# Patient Record
Sex: MALE | Race: WHITE | NOT HISPANIC OR LATINO | Employment: FULL TIME | URBAN - METROPOLITAN AREA
[De-identification: names, ages, dates, MRNs, and addresses within clinical notes are randomized per-mention and may not be internally consistent; named-entity substitution may affect disease eponyms.]

---

## 2022-02-28 ENCOUNTER — HOSPITAL ENCOUNTER (EMERGENCY)
Facility: HOSPITAL | Age: 42
Discharge: HOME | End: 2022-02-28
Attending: EMERGENCY MEDICINE | Admitting: EMERGENCY MEDICINE

## 2022-02-28 ENCOUNTER — APPOINTMENT (OUTPATIENT)
Dept: CT IMAGING | Facility: HOSPITAL | Age: 42
End: 2022-02-28

## 2022-02-28 VITALS
RESPIRATION RATE: 16 BRPM | SYSTOLIC BLOOD PRESSURE: 118 MMHG | OXYGEN SATURATION: 97 % | DIASTOLIC BLOOD PRESSURE: 87 MMHG | HEART RATE: 80 BPM | TEMPERATURE: 98.1 F | HEIGHT: 72 IN

## 2022-02-28 DIAGNOSIS — G43.009 MIGRAINE WITHOUT AURA AND WITHOUT STATUS MIGRAINOSUS, NOT INTRACTABLE: Primary | ICD-10-CM

## 2022-02-28 PROCEDURE — 25010000002 PROCHLORPERAZINE 10 MG/2ML SOLUTION: Performed by: EMERGENCY MEDICINE

## 2022-02-28 PROCEDURE — 70450 CT HEAD/BRAIN W/O DYE: CPT

## 2022-02-28 PROCEDURE — 25010000002 KETOROLAC TROMETHAMINE PER 15 MG: Performed by: EMERGENCY MEDICINE

## 2022-02-28 PROCEDURE — 96375 TX/PRO/DX INJ NEW DRUG ADDON: CPT

## 2022-02-28 PROCEDURE — 25010000002 DIPHENHYDRAMINE PER 50 MG: Performed by: EMERGENCY MEDICINE

## 2022-02-28 PROCEDURE — 99282 EMERGENCY DEPT VISIT SF MDM: CPT

## 2022-02-28 PROCEDURE — 96374 THER/PROPH/DIAG INJ IV PUSH: CPT

## 2022-02-28 RX ORDER — PROCHLORPERAZINE EDISYLATE 5 MG/ML
10 INJECTION INTRAMUSCULAR; INTRAVENOUS EVERY 6 HOURS PRN
Status: DISCONTINUED | OUTPATIENT
Start: 2022-02-28 | End: 2022-02-28 | Stop reason: HOSPADM

## 2022-02-28 RX ORDER — KETOROLAC TROMETHAMINE 30 MG/ML
30 INJECTION, SOLUTION INTRAMUSCULAR; INTRAVENOUS ONCE
Status: COMPLETED | OUTPATIENT
Start: 2022-02-28 | End: 2022-02-28

## 2022-02-28 RX ORDER — DIPHENHYDRAMINE HYDROCHLORIDE 50 MG/ML
25 INJECTION INTRAMUSCULAR; INTRAVENOUS ONCE
Status: COMPLETED | OUTPATIENT
Start: 2022-02-28 | End: 2022-02-28

## 2022-02-28 RX ORDER — BUTALBITAL, ACETAMINOPHEN AND CAFFEINE 50; 325; 40 MG/1; MG/1; MG/1
1 TABLET ORAL EVERY 8 HOURS PRN
Qty: 12 TABLET | Refills: 0 | Status: SHIPPED | OUTPATIENT
Start: 2022-02-28

## 2022-02-28 RX ORDER — SODIUM CHLORIDE 0.9 % (FLUSH) 0.9 %
10 SYRINGE (ML) INJECTION AS NEEDED
Status: DISCONTINUED | OUTPATIENT
Start: 2022-02-28 | End: 2022-02-28 | Stop reason: HOSPADM

## 2022-02-28 RX ADMIN — PROCHLORPERAZINE EDISYLATE 10 MG: 5 INJECTION INTRAMUSCULAR; INTRAVENOUS at 05:49

## 2022-02-28 RX ADMIN — DIPHENHYDRAMINE HYDROCHLORIDE 25 MG: 50 INJECTION, SOLUTION INTRAMUSCULAR; INTRAVENOUS at 05:49

## 2022-02-28 RX ADMIN — KETOROLAC TROMETHAMINE 30 MG: 30 INJECTION, SOLUTION INTRAMUSCULAR; INTRAVENOUS at 05:49

## 2022-05-17 ENCOUNTER — HOSPITAL ENCOUNTER (EMERGENCY)
Facility: HOSPITAL | Age: 42
Discharge: LEFT BEFORE DISCHARGE | End: 2022-05-17

## 2022-05-17 VITALS
OXYGEN SATURATION: 96 % | RESPIRATION RATE: 16 BRPM | BODY MASS INDEX: 33.86 KG/M2 | DIASTOLIC BLOOD PRESSURE: 94 MMHG | SYSTOLIC BLOOD PRESSURE: 119 MMHG | WEIGHT: 250 LBS | HEIGHT: 72 IN | HEART RATE: 95 BPM | TEMPERATURE: 97.5 F

## 2022-05-17 PROCEDURE — 99211 OFF/OP EST MAY X REQ PHY/QHP: CPT

## 2022-05-17 PROCEDURE — 99283 EMERGENCY DEPT VISIT LOW MDM: CPT

## 2022-05-18 ENCOUNTER — HOSPITAL ENCOUNTER (EMERGENCY)
Facility: HOSPITAL | Age: 42
Discharge: HOME | End: 2022-05-18
Attending: EMERGENCY MEDICINE | Admitting: EMERGENCY MEDICINE

## 2022-05-18 VITALS
HEIGHT: 70 IN | OXYGEN SATURATION: 96 % | RESPIRATION RATE: 20 BRPM | HEART RATE: 65 BPM | TEMPERATURE: 98.3 F | BODY MASS INDEX: 36.33 KG/M2 | SYSTOLIC BLOOD PRESSURE: 120 MMHG | DIASTOLIC BLOOD PRESSURE: 70 MMHG | WEIGHT: 253.75 LBS

## 2022-05-18 DIAGNOSIS — G43.709 CHRONIC MIGRAINE WITHOUT AURA WITHOUT STATUS MIGRAINOSUS, NOT INTRACTABLE: Primary | ICD-10-CM

## 2022-05-18 PROCEDURE — 96375 TX/PRO/DX INJ NEW DRUG ADDON: CPT

## 2022-05-18 PROCEDURE — 25010000002 DROPERIDOL PER 5 MG: Performed by: NURSE PRACTITIONER

## 2022-05-18 PROCEDURE — 96361 HYDRATE IV INFUSION ADD-ON: CPT

## 2022-05-18 PROCEDURE — 25010000002 KETOROLAC TROMETHAMINE PER 15 MG: Performed by: NURSE PRACTITIONER

## 2022-05-18 PROCEDURE — 96374 THER/PROPH/DIAG INJ IV PUSH: CPT

## 2022-05-18 RX ORDER — DROPERIDOL 2.5 MG/ML
2.5 INJECTION, SOLUTION INTRAMUSCULAR; INTRAVENOUS ONCE
Status: COMPLETED | OUTPATIENT
Start: 2022-05-18 | End: 2022-05-18

## 2022-05-18 RX ORDER — ONDANSETRON 2 MG/ML
4 INJECTION INTRAMUSCULAR; INTRAVENOUS ONCE
Status: DISCONTINUED | OUTPATIENT
Start: 2022-05-18 | End: 2022-05-18

## 2022-05-18 RX ORDER — KETOROLAC TROMETHAMINE 30 MG/ML
30 INJECTION, SOLUTION INTRAMUSCULAR; INTRAVENOUS ONCE
Status: COMPLETED | OUTPATIENT
Start: 2022-05-18 | End: 2022-05-18

## 2022-05-18 RX ORDER — RIZATRIPTAN BENZOATE 5 MG/1
5 TABLET ORAL DAILY PRN
Qty: 15 TABLET | Refills: 0 | Status: SHIPPED | OUTPATIENT
Start: 2022-05-18

## 2022-05-18 RX ADMIN — DROPERIDOL 2.5 MG: 2.5 INJECTION, SOLUTION INTRAMUSCULAR; INTRAVENOUS at 01:54

## 2022-05-18 RX ADMIN — KETOROLAC TROMETHAMINE 30 MG: 30 INJECTION, SOLUTION INTRAMUSCULAR; INTRAVENOUS at 01:55

## 2022-05-18 RX ADMIN — SODIUM CHLORIDE 1000 ML: 9 INJECTION, SOLUTION INTRAVENOUS at 01:54

## 2022-05-18 NOTE — ED PROVIDER NOTES
Time: 02:36 EDT  Arrived by: private vehicle  Chief Complaint: headache  History provided by: Patient  History is limited by: N/A    History of Present Illness:  Patient is a 41 y.o. year old male that presents to the emergency department with headache      History provided by:  Patient  Headache  Pain location:  Frontal  Quality:  Dull  Radiates to:  Does not radiate  Severity currently:  8/10  Severity at highest:  8/10  Onset quality:  Gradual  Duration:  18 hours  Timing:  Constant  Progression:  Unchanged  Chronicity:  Recurrent  Similar to prior headaches: yes    Context: bright light and loud noise    Relieved by:  Nothing  Worsened by:  Nothing  Ineffective treatments: excedrin.  Associated symptoms: nausea and photophobia    Associated symptoms: no blurred vision, no congestion, no cough, no diarrhea, no dizziness, no drainage, no ear pain, no eye pain, no facial pain, no fatigue, no fever, no focal weakness, no hearing loss, no loss of balance, no myalgias, no near-syncope, no neck pain, no neck stiffness, no numbness, no paresthesias, no seizures, no sinus pressure, no sore throat, no swollen glands, no syncope, no tingling, no URI, no visual change, no vomiting and no weakness        Similar Symptoms Previously: yes. Chronic. Gets migraines 3 times/week    Recently seen: not here. Has neuro appt [facility] at       Patient Care Team  Primary Care Provider: louisa resendiz    Past Medical History:     Allergies   Allergen Reactions   • Benadryl [Diphenhydramine] Anxiety     No past medical history on file.  No past surgical history on file.  No family history on file.    Home Medications:  Prior to Admission medications    Medication Sig Start Date End Date Taking? Authorizing Provider   butalbital-acetaminophen-caffeine (FIORICET, ESGIC) -40 MG per tablet Take 1 tablet by mouth Every 8 (Eight) Hours As Needed for Headache. 2/28/22   Mohamud Aviles MD        Social History:   PT  has no history on  "file for tobacco use, alcohol use, and drug use.    Record Review:  I have reviewed the patient's records in [facility].     Review of Systems  Review of Systems   Constitutional: Negative.  Negative for fatigue and fever.   HENT: Negative for congestion, ear pain, hearing loss, postnasal drip, sinus pressure and sore throat.    Eyes: Positive for photophobia. Negative for blurred vision and pain.   Respiratory: Negative for cough.    Cardiovascular: Negative for syncope and near-syncope.   Gastrointestinal: Positive for nausea. Negative for diarrhea and vomiting.   Genitourinary: Negative for flank pain.   Musculoskeletal: Negative.  Negative for myalgias, neck pain and neck stiffness.   Skin: Negative.    Neurological: Positive for headaches. Negative for dizziness, tremors, focal weakness, seizures, syncope, speech difficulty, weakness, light-headedness, numbness, paresthesias and loss of balance.   Hematological: Negative.    Psychiatric/Behavioral: Negative.         Physical Exam  /70   Pulse 65   Temp 98.3 °F (36.8 °C) (Oral)   Resp 20   Ht 177.8 cm (70\")   Wt 115 kg (253 lb 12 oz)   SpO2 96%   BMI 36.41 kg/m²     Physical Exam  Vitals and nursing note reviewed.   Constitutional:       General: He is not in acute distress.     Appearance: Normal appearance. He is not toxic-appearing.   HENT:      Head: Normocephalic and atraumatic.      Right Ear: Tympanic membrane, ear canal and external ear normal.      Left Ear: Tympanic membrane, ear canal and external ear normal.      Mouth/Throat:      Mouth: Mucous membranes are moist.   Eyes:      General: No scleral icterus.     Extraocular Movements: Extraocular movements intact.      Conjunctiva/sclera: Conjunctivae normal.      Pupils: Pupils are equal, round, and reactive to light.   Cardiovascular:      Rate and Rhythm: Normal rate and regular rhythm.      Pulses: Normal pulses.      Heart sounds: Normal heart sounds.   Pulmonary:      Effort: Pulmonary " "effort is normal. No respiratory distress.      Breath sounds: Normal breath sounds.   Abdominal:      Tenderness: There is no abdominal tenderness.   Musculoskeletal:         General: Normal range of motion.      Cervical back: Normal range of motion and neck supple. No tenderness.   Skin:     General: Skin is warm and dry.   Neurological:      Mental Status: He is alert and oriented to person, place, and time.      Cranial Nerves: No cranial nerve deficit.      Sensory: No sensory deficit.      Motor: No weakness.   Psychiatric:         Mood and Affect: Mood normal.         Behavior: Behavior normal.          ED Course  /70   Pulse 65   Temp 98.3 °F (36.8 °C) (Oral)   Resp 20   Ht 177.8 cm (70\")   Wt 115 kg (253 lb 12 oz)   SpO2 96%   BMI 36.41 kg/m²   No results found for this or any previous visit.  Medications   ketorolac (TORADOL) injection 30 mg (30 mg Intravenous Given 5/18/22 0155)   sodium chloride 0.9 % bolus 1,000 mL (0 mL Intravenous Stopped 5/18/22 0234)   droperidol (INAPSINE) injection 2.5 mg (2.5 mg Intravenous Given 5/18/22 0154)     No results found.      Medical Decision Making:                     MDM  Number of Diagnoses or Management Options  Chronic migraine without aura without status migrainosus, not intractable  Diagnosis management comments: The patient presented to the emergency department with a headache. The patient is now resting comfortably in feels better, is alert, talkative, interactive and in no distress after ED treatment. The patient appears well and is able to tolerate PO fluids. Repeat examination is unremarkable and benign. The patient is neurologically intact, has a normal mental status, and this ambulatory in the ED. The history, exam, diagnostic testing (if any) and the patient's current condition do not suggest meningitis, stroke, sepsis, subarachnoid hemorrhage, intracranial bleeding, encephalitis, temporal arteritis or other significant pathology to " warrant further testing, continued ED treatment, admission, neurological consultation, for other specialist evaluation at this point. The vital signs have been stable. The patient's condition is stable and appropriate for discharge. The patient will pursue further outpatient evaluation with the primary care physician or other designated or consulting physician as indicated in the discharge instructions.       Amount and/or Complexity of Data Reviewed  Tests in the medicine section of CPT®: ordered and reviewed    Risk of Complications, Morbidity, and/or Mortality  Presenting problems: minimal  Management options: minimal    Patient Progress  Patient progress: stable       Final diagnoses:   Chronic migraine without aura without status migrainosus, not intractable        Disposition:  ED Disposition     ED Disposition   Discharge    Condition   Stable    Comment   --              Linda Cooper, APRN  05/18/22 0236

## 2022-05-18 NOTE — DISCHARGE INSTRUCTIONS
Take medication as needed for migraine.    Follow-up with neurologist as scheduled at  for further evaluation and chronic long-term management.    Return for new or worsening symptoms

## 2022-05-18 NOTE — ED NOTES
Pt reports migraine starting 1700 this evening. Pt states that he has been seen for migraines before. Pt reports pain bilateral temporal areas. Pt reports pain is 8/10.